# Patient Record
Sex: FEMALE | Race: WHITE | URBAN - METROPOLITAN AREA
[De-identification: names, ages, dates, MRNs, and addresses within clinical notes are randomized per-mention and may not be internally consistent; named-entity substitution may affect disease eponyms.]

---

## 2017-11-28 ENCOUNTER — TRANSFERRED RECORDS (OUTPATIENT)
Dept: HEALTH INFORMATION MANAGEMENT | Facility: CLINIC | Age: 62
End: 2017-11-28

## 2017-12-13 ENCOUNTER — TRANSFERRED RECORDS (OUTPATIENT)
Dept: HEALTH INFORMATION MANAGEMENT | Facility: CLINIC | Age: 62
End: 2017-12-13

## 2018-01-02 DIAGNOSIS — H53.10 SUBJECTIVE VISION DISTURBANCE: Primary | ICD-10-CM

## 2018-01-05 ENCOUNTER — MEDICAL CORRESPONDENCE (OUTPATIENT)
Dept: HEALTH INFORMATION MANAGEMENT | Facility: CLINIC | Age: 63
End: 2018-01-05

## 2018-01-06 ENCOUNTER — HEALTH MAINTENANCE LETTER (OUTPATIENT)
Age: 63
End: 2018-01-06

## 2018-01-09 ENCOUNTER — OFFICE VISIT (OUTPATIENT)
Dept: OPHTHALMOLOGY | Facility: CLINIC | Age: 63
End: 2018-01-09
Attending: OPHTHALMOLOGY
Payer: COMMERCIAL

## 2018-01-09 DIAGNOSIS — H46.9 OPTIC NEURITIS: Primary | ICD-10-CM

## 2018-01-09 DIAGNOSIS — H53.10 SUBJECTIVE VISION DISTURBANCE: ICD-10-CM

## 2018-01-09 DIAGNOSIS — H46.9 OPTIC NEURITIS: ICD-10-CM

## 2018-01-09 LAB — MISCELLANEOUS TEST: NORMAL

## 2018-01-09 PROCEDURE — G0463 HOSPITAL OUTPT CLINIC VISIT: HCPCS | Mod: ZF

## 2018-01-09 PROCEDURE — 92133 CPTRZD OPH DX IMG PST SGM ON: CPT | Mod: ZF | Performed by: OPHTHALMOLOGY

## 2018-01-09 PROCEDURE — 92083 EXTENDED VISUAL FIELD XM: CPT | Mod: ZF | Performed by: OPHTHALMOLOGY

## 2018-01-09 RX ORDER — CITALOPRAM HYDROBROMIDE 10 MG/1
TABLET ORAL
COMMUNITY
Start: 2002-04-30

## 2018-01-09 RX ORDER — HYDROCHLOROTHIAZIDE 25 MG/1
TABLET ORAL
COMMUNITY
Start: 2000-04-30

## 2018-01-09 RX ORDER — ASPIRIN 81 MG/1
TABLET, CHEWABLE ORAL
COMMUNITY

## 2018-01-09 ASSESSMENT — TONOMETRY
OD_IOP_MMHG: 13
IOP_METHOD: ICARE
OS_IOP_MMHG: 13

## 2018-01-09 ASSESSMENT — SLIT LAMP EXAM - LIDS
COMMENTS: NORMAL
COMMENTS: NORMAL

## 2018-01-09 ASSESSMENT — CONF VISUAL FIELD
OD_INFERIOR_NASAL_RESTRICTION: 1
OS_NORMAL: 1
OD_INFERIOR_TEMPORAL_RESTRICTION: 1

## 2018-01-09 ASSESSMENT — EXTERNAL EXAM - RIGHT EYE: OD_EXAM: NORMAL

## 2018-01-09 ASSESSMENT — VISUAL ACUITY
OS_PH_SC: 20/20-2
OD_SC: 20/20
METHOD: SNELLEN - LINEAR
OS_SC: 20/30

## 2018-01-09 ASSESSMENT — CUP TO DISC RATIO
OS_RATIO: 0.4
OD_RATIO: 0.3

## 2018-01-09 ASSESSMENT — EXTERNAL EXAM - LEFT EYE: OS_EXAM: NORMAL

## 2018-01-09 NOTE — PROGRESS NOTES
Assessment & Plan     Remedios River is a 62 year old female with the following diagnoses:   1. Optic neuritis    2. Subjective vision disturbance       In October she noted that there is something funny with her vision, this lasted for about 3 or 4 weeks until one day right before Thanksgiving she developed a very large and dense central scotoma.  And was noted to have a swollen optic nerve.  At that time she went into the eye doctor.  She underwent an MRI which showed some white matter lesions.  She was given IV steroids for 3 days.  Her vision improved by about 80%.  She denies any pain.  She continues to have some blotchiness in the center of her vision.  She has a sister with MS.    On exam her visual acuity is 20/20 in each eye.  Her color vision is normal in each eye.  Her fundus examination shows no evidence of optic disc edema however there are marisa-papillary hemorrhages in the right eye.  She also had an afferent pupillary defect in the right eye.    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  It is atypical because she is much older than normal, she does not have any pain, and there are pericapillary hemorrhages.  It is unlikely that this is related to multiple sclerosis.  I will obtain a laboratory workup to include sarcoid, syphilis, lupus, Lyme, and neuromyelitis optica.  I will also obtain an anti-Mog antibody.  Since she has gotten so much better and has not had a relapse off steroids, I would recommend observation if her lab workup is negative.  I recommended that she see Dr. Thompson in 2 months if she is stable or getting better.  If she feels like she is getting worse then I have asked her to call me as soon as possible.           Attending Physician Attestation:  Complete documentation of historical and exam elements from today's encounter can be found in the full encounter summary report (not reduplicated in this progress note).  I personally obtained the chief  complaint(s) and history of present illness.  I confirmed and edited as necessary the review of systems, past medical/surgical history, family history, social history, and examination findings as documented by others; and I examined the patient myself.  I personally reviewed the relevant tests, images, and reports as documented above.  I formulated and edited as necessary the assessment and plan and discussed the findings and management plan with the patient and family. - Winston Braxton MD

## 2018-01-09 NOTE — LETTER
2018         RE:  :  MRN: Remedios River  1955  [id number]     Dear Dr. Givens,    Thank you for asking me to see your very pleasant patient, Remedios River, in neuro-ophthalmic consultation.  I would like to thank you for sending your records and I have summarized them in the history of present illness. She presented with her spouse who provided additional history.  My assessment and plan are below.  For further details, please see my attached clinic note.      Assessment & Plan     Remedios River is a 62 year old female with the following diagnoses:   1. Optic neuritis    2. Subjective vision disturbance       In October she noted that there is something funny with her vision, this lasted for about 3 or 4 weeks until one day right before Thanksgi she developed a very large and dense central scotoma, and was noted to have a swollen optic nerve.  At that time she went into the eye doctor.  She underwent an MRI which showed some white matter lesions.  She was given IV steroids for 3 days.  Her vision improved by about 80%.  She denies any pain.  She continues to have some blotchiness in the center of her vision.  She has a sister with MS.    On exam her visual acuity is 20/20 in each eye.  Her color vision is normal in each eye.  Her fundus examination shows no evidence of optic disc edema however there are marisa-papillary hemorrhages in the right eye.  She also had an afferent pupillary defect in the right eye.    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  It is atypical because she is much older than normal, she does not have any pain, and there are pericapillary hemorrhages.  It is unlikely that this is related to multiple sclerosis.  I will obtain a laboratory workup to include sarcoid, syphilis, lupus, Lyme, and neuromyelitis optica.  I will also obtain an anti-Mog antibody.  Since she has gotten so much better and has not had a relapse off steroids, I would recommend observation if  her lab workup is negative.  I recommended that she see Dr. Givens in 2 months if she is stable or getting better.  If she feels like she is getting worse then I have asked her to call me as soon as possible.          Again, thank you for allowing me to participate in the care of your patient.      Sincerely,    Winston Braxton MD  Professor, Neuro-Ophthalmology  Department of Ophthalmology and Visual Neurosciences  [facility]    CC: Mata Givens MD  [facility]  [address]  [place] [address]  VIA Facsimile:  [phone]     Berenice Navarro  [facility]  [address]  [place] [address]  VIA Facsimile: [phone]       DX = atypical optic neuritis

## 2018-01-09 NOTE — MR AVS SNAPSHOT
After Visit Summary   1/9/2018    Remedios River    MRN: [id number]           Patient Information     Date Of Birth          1955        Visit Information        Provider Department      1/9/2018 8:30 AM Winston Braxton MD [facility]        Today's Diagnoses     Optic neuritis    -  1    Subjective vision disturbance           Follow-ups after your visit        Your next 10 appointments already scheduled     Jan 09, 2018  1:15 PM CST   LAB with  LAB    Health Lab ([facility])    [address] [address] [phone]           Please do not eat 10-12 hours before your appointment if you are coming in fasting for labs on lipids, cholesterol, or glucose (sugar). This does not apply to pregnant women. Water, hot tea and black coffee (with nothing added) are okay. Do not drink other fluids, diet soda or chew gum.              Who to contact     Please call your clinic at [phone] to:    Ask questions about your health    Make or cancel appointments    Discuss your medicines    Learn about your test results    Speak to your doctor   If you have compliments or concerns about an experience at your clinic, or if you wish to file a complaint, please contact [facility] Patient Relations at [phone] or email us at [email or web address]         Additional Information About Your Visit        [email or web address] Information     [facility] gives you secure access to your electronic health record. If you see a primary care provider, you can also send messages to your care team and make appointments. If you have questions, please call your primary care clinic.  If you do not have a primary care provider, please call [phone] and they will assist you.      [facility] is an electronic gateway that provides easy, online access to your medical records. With [facility], you can request a clinic appointment, read your test  results, renew a prescription or communicate with your care team.     To access your existing account, please contact your [facility] or call [phone] for assistance.        Care EveryWhere ID     This is your Care EveryWhere ID. This could be used by other organizations to access your [place] medical records  [id number]         Blood Pressure from Last 3 Encounters:   No data found for BP    Weight from Last 3 Encounters:   No data found for Wt              We Performed the Following     Glaucoma Top OU     IOP Measurement     OCT Optic Nerve RNFL Spectralis OU (both eyes)        Primary Care Provider Office Phone # Fax #    Berenice Navarro [phone]       [facility] [address]  [facility] [address]        Equal Access to Services     ELIEL HOLDEN : Hadii aad ku hadasho Soomaali, waaxda luqadaha, qaybta kaalmada adeegyada, waxay juliusin haykatn meagan duran . So [facility] [phone].    ATENCIÓN: Si habla español, tiene a jenkins disposición servicios gratuitos de asistencia lingüística. Llame al [phone].    We comply with applicable federal civil rights laws and [place] laws. We do not discriminate on the basis of race, color, national origin, age, disability, sex, sexual orientation, or gender identity.            Thank you!     Thank you for choosing [facility]  for your care. Our goal is always to provide you with excellent care. Hearing back from our patients is one way we can continue to improve our services. Please take a few minutes to complete the written survey that you may receive in the mail after your visit with us. Thank you!             Your Updated Medication List - Protect others around you: Learn how to safely use, store and throw away your medicines at www.disposemymeds.org.          This list is accurate as of: 1/9/18 11:58 AM.  Always use your most recent med list.                   Brand Name Dispense Instructions  for use Diagnosis    aspirin 81 MG chewable tablet           celeXA 10 MG tablet   Generic drug:  citalopram           hydrochlorothiazide 25 MG tablet    NILESH

## 2018-01-09 NOTE — NURSING NOTE
Chief Complaints and History of Present Illnesses   Patient presents with     Neurologic Problem     OPTIC NEURITIS     HPI    Symptoms:              Comments:  Remedios is a 62 year old female referred by [facility] for:    1. Optic neuritis, right eye.   - onset middle to end of October 2017. Had a blotch of gray centrally in the right eye. Sometimes she was able to see through it, and other times had purple tint. Missing field  - did not improve by thanksgiving 2017.   - saw Dr. Givens. Dx as optic neuritis   - MRI done W/WO contrast in [place]. Had some white matter lesions per patient.   - had 3 days of 1g steroid.   - significant improvement in vision, but still residual loss right eye.   - sister has history of MS.   - no eye pain right eye with onset of vision changes.   - did have tingling in fingers and face. Also c/o facial twitches. Improved in frequency, but still happen.   - history of hypertension. Managed with medication.   - no hyperlipidemia.     Anuel NEWBERRY 8:24 AM January 9, 2018

## 2018-01-10 LAB
ACE SERPL-CCNC: 39 U/L (ref 9–67)
ALBUMIN SERPL ELPH-MCNC: 4.4 G/DL (ref 3.7–5.1)
ALPHA1 GLOB SERPL ELPH-MCNC: 0.3 G/DL (ref 0.2–0.4)
ALPHA2 GLOB SERPL ELPH-MCNC: 0.8 G/DL (ref 0.5–0.9)
ANA PAT SER IF-IMP: ABNORMAL
ANA SER QL IF: POSITIVE
ANA TITR SER IF: ABNORMAL {TITER}
B BURGDOR IGG+IGM SER QL: 0.06 (ref 0–0.89)
B-GLOBULIN SERPL ELPH-MCNC: 1 G/DL (ref 0.6–1)
GAMMA GLOB SERPL ELPH-MCNC: 1.1 G/DL (ref 0.7–1.6)
M PROTEIN SERPL ELPH-MCNC: 0 G/DL
PROT PATTERN SERPL ELPH-IMP: NORMAL
T PALLIDUM IGG+IGM SER QL: NEGATIVE

## 2018-01-12 LAB
RESULT: NORMAL
SEND OUTS MISC TEST CODE: NORMAL
SEND OUTS MISC TEST SPECIMEN: NORMAL
TEST NAME: NORMAL

## 2018-01-17 LAB — AQP4 H2O CHANNEL IGG TITR SERPL IF: NORMAL {TITER}

## 2019-10-03 ENCOUNTER — HEALTH MAINTENANCE LETTER (OUTPATIENT)
Age: 64
End: 2019-10-03

## 2020-02-08 ENCOUNTER — HEALTH MAINTENANCE LETTER (OUTPATIENT)
Age: 65
End: 2020-02-08

## 2020-11-07 ENCOUNTER — HEALTH MAINTENANCE LETTER (OUTPATIENT)
Age: 65
End: 2020-11-07

## 2021-03-27 ENCOUNTER — HEALTH MAINTENANCE LETTER (OUTPATIENT)
Age: 66
End: 2021-03-27

## 2021-09-05 ENCOUNTER — HEALTH MAINTENANCE LETTER (OUTPATIENT)
Age: 66
End: 2021-09-05

## 2021-10-31 ENCOUNTER — HEALTH MAINTENANCE LETTER (OUTPATIENT)
Age: 66
End: 2021-10-31

## 2022-04-17 ENCOUNTER — HEALTH MAINTENANCE LETTER (OUTPATIENT)
Age: 67
End: 2022-04-17

## 2022-10-23 ENCOUNTER — HEALTH MAINTENANCE LETTER (OUTPATIENT)
Age: 67
End: 2022-10-23

## 2023-06-01 ENCOUNTER — HEALTH MAINTENANCE LETTER (OUTPATIENT)
Age: 68
End: 2023-06-01

## 2023-11-11 ENCOUNTER — HEALTH MAINTENANCE LETTER (OUTPATIENT)
Age: 68
End: 2023-11-11